# Patient Record
Sex: FEMALE | Race: WHITE | NOT HISPANIC OR LATINO | Employment: OTHER | URBAN - METROPOLITAN AREA
[De-identification: names, ages, dates, MRNs, and addresses within clinical notes are randomized per-mention and may not be internally consistent; named-entity substitution may affect disease eponyms.]

---

## 2024-09-25 ENCOUNTER — HOSPITAL ENCOUNTER (EMERGENCY)
Facility: HOSPITAL | Age: 39
Discharge: HOME | End: 2024-09-25
Attending: STUDENT IN AN ORGANIZED HEALTH CARE EDUCATION/TRAINING PROGRAM
Payer: MEDICAID

## 2024-09-25 VITALS
HEART RATE: 91 BPM | BODY MASS INDEX: 38.41 KG/M2 | HEIGHT: 64 IN | SYSTOLIC BLOOD PRESSURE: 149 MMHG | OXYGEN SATURATION: 98 % | WEIGHT: 225 LBS | RESPIRATION RATE: 18 BRPM | DIASTOLIC BLOOD PRESSURE: 92 MMHG | TEMPERATURE: 99 F

## 2024-09-25 DIAGNOSIS — T14.8XXA MUSCLE STRAIN: ICD-10-CM

## 2024-09-25 DIAGNOSIS — M25.512 LEFT SHOULDER PAIN, UNSPECIFIED CHRONICITY: Primary | ICD-10-CM

## 2024-09-25 PROCEDURE — 63600175 PHARM REV CODE 636 W HCPCS: Performed by: NURSE PRACTITIONER

## 2024-09-25 PROCEDURE — 96372 THER/PROPH/DIAG INJ SC/IM: CPT | Performed by: NURSE PRACTITIONER

## 2024-09-25 PROCEDURE — 99284 EMERGENCY DEPT VISIT MOD MDM: CPT | Mod: 25

## 2024-09-25 RX ORDER — IBUPROFEN 600 MG/1
600 TABLET ORAL EVERY 8 HOURS PRN
Qty: 15 TABLET | Refills: 0 | Status: SHIPPED | OUTPATIENT
Start: 2024-09-25

## 2024-09-25 RX ORDER — KETOROLAC TROMETHAMINE 30 MG/ML
60 INJECTION, SOLUTION INTRAMUSCULAR; INTRAVENOUS
Status: COMPLETED | OUTPATIENT
Start: 2024-09-25 | End: 2024-09-25

## 2024-09-25 RX ORDER — CYCLOBENZAPRINE HCL 5 MG
5 TABLET ORAL 3 TIMES DAILY PRN
Qty: 15 TABLET | Refills: 0 | Status: SHIPPED | OUTPATIENT
Start: 2024-09-25

## 2024-09-25 RX ORDER — HYDROCODONE BITARTRATE AND ACETAMINOPHEN 5; 325 MG/1; MG/1
1 TABLET ORAL EVERY 6 HOURS PRN
Qty: 12 TABLET | Refills: 0 | Status: SHIPPED | OUTPATIENT
Start: 2024-09-25

## 2024-09-25 RX ADMIN — KETOROLAC TROMETHAMINE 60 MG: 60 INJECTION, SOLUTION INTRAMUSCULAR at 08:09

## 2024-09-26 NOTE — ED PROVIDER NOTES
Encounter Date: 9/25/2024       History     Chief Complaint   Patient presents with    Shoulder Pain     Patient states left shoulder pain that began yesterday after she moved some heavy equipment at work.  States that pain is intermittent and worsens with movement and palpation.  States that pain is sharp in nature.  States that pain is an 8/10.  Denies any numbness or tingling to her extremities.  States that she did have some pain relief after using a heating pad last night.  Denies any past medical history.    The history is provided by the patient.   Shoulder Pain  This is a new problem. The current episode started yesterday. Episode frequency: Intermittently. The problem has not changed since onset.Pertinent negatives include no chest pain, no abdominal pain, no headaches and no shortness of breath. Exacerbated by: Movement. The symptoms are relieved by rest. She has tried a warm compress for the symptoms. The treatment provided mild relief.     Review of patient's allergies indicates:  No Known Allergies  Past Medical History:   Diagnosis Date    Rotator cuff injury     Bilateral     No past surgical history on file.  No family history on file.     Review of Systems   Constitutional: Negative.    HENT: Negative.     Eyes: Negative.    Respiratory: Negative.  Negative for shortness of breath.    Cardiovascular: Negative.  Negative for chest pain.   Gastrointestinal: Negative.  Negative for abdominal pain.   Endocrine: Negative.    Genitourinary: Negative.    Musculoskeletal:  Negative for back pain and neck pain.        Shoulder pain.   Skin: Negative.    Allergic/Immunologic: Negative.    Neurological: Negative.  Negative for headaches.   Hematological: Negative.    Psychiatric/Behavioral: Negative.     All other systems reviewed and are negative.      Physical Exam     Initial Vitals [09/25/24 1919]   BP Pulse Resp Temp SpO2   (!) 149/92 91 18 98.6 °F (37 °C) 98 %      MAP       --         Physical  Exam    Nursing note and vitals reviewed.  Constitutional: She appears well-developed and well-nourished. No distress.   HENT:   Head: Normocephalic and atraumatic.   Mouth/Throat: Oropharynx is clear and moist.   Eyes: Conjunctivae and EOM are normal. Pupils are equal, round, and reactive to light.   Neck: Neck supple.   Normal range of motion.  Cardiovascular:  Normal rate, regular rhythm, normal heart sounds and intact distal pulses.           Pulses:       Radial pulses are 2+ on the right side and 2+ on the left side.   Pulmonary/Chest: Breath sounds normal. No respiratory distress. She has no wheezes.   Abdominal: Abdomen is soft. Bowel sounds are normal. She exhibits no distension. There is no abdominal tenderness.   Musculoskeletal:         General: No edema. Normal range of motion.      Right shoulder: Normal.      Left shoulder: Tenderness present. No swelling. Normal range of motion. Normal strength.        Arms:       Cervical back: Normal range of motion and neck supple.     Neurological: She is alert and oriented to person, place, and time. She has normal strength. Gait normal. GCS score is 15. GCS eye subscore is 4. GCS verbal subscore is 5. GCS motor subscore is 6.   Skin: Skin is warm and dry. No rash noted.   Psychiatric: She has a normal mood and affect. Thought content normal.         ED Course   Procedures  Labs Reviewed - No data to display       Imaging Results              X-Ray Shoulder 2 or More Views Left (Preliminary result)  Result time 09/25/24 20:12:46      Wet Read by Leticia Bowman FNP (09/25/24 20:12:46, [facility] Emergency Dept, Emergency Medicine)    Negative.                                     Medications   ketorolac injection 60 mg (60 mg Intramuscular Given 9/25/24 2029)     Medical Decision Making  Patient states left shoulder pain that began yesterday after she moved some heavy equipment at work.  States that pain is intermittent and worsens with  movement and palpation.  States that pain is sharp in nature.  States that pain is an 8/10.  Denies any numbness or tingling to her extremities.  States that she did have some pain relief after using a heating pad last night.  Denies any past medical history.    The history is provided by the patient.   Shoulder Pain  This is a new problem. The current episode started yesterday. Episode frequency: Intermittently. The problem has not changed since onset.Pertinent negatives include no chest pain, no abdominal pain, no headaches and no shortness of breath. Exacerbated by: Movement. The symptoms are relieved by rest. She has tried a warm compress for the symptoms. The treatment provided mild relief.       Amount and/or Complexity of Data Reviewed  Radiology: ordered and independent interpretation performed.  Discussion of management or test interpretation with external provider(s): Differential diagnosis (including but not limited to):   Judging by the patient's chief complaint and pertinent history, the patient has the following possible differential diagnoses, including but not limited to the following.  Some of these are deemed to be lower likelihood and some more likely based on my physical exam and history combined with possible lab work and/or imaging studies.   Please see the pertinent studies, and refer to the HPI.  Some of these diagnoses will take further evaluation to fully rule out, perhaps as an outpatient and the patient was encouraged to follow up when discharged for more comprehensive evaluation.  fracture, sprain, strain, rotator cuff injury, muscle strain, shoulder pain  Patient's x-ray her left shoulder does not show any acute changes.  Patient is given Toradol IM for pain in the ED. patient has full range of motion to her left shoulder and patient appears to have a muscle strain type injury.  Will discharge with pain control.  ED return precautions given.          Risk  Prescription drug  management.                                      Clinical Impression:  Final diagnoses:  [M25.512] Left shoulder pain, unspecified chronicity (Primary)  [T14.8XXA] Muscle strain          ED Disposition Condition    Discharge Stable          ED Prescriptions       Medication Sig Dispense Start Date End Date Auth. Provider    ibuprofen (ADVIL,MOTRIN) 600 MG tablet Take 1 tablet (600 mg total) by mouth every 8 (eight) hours as needed for Pain. 15 tablet 9/25/2024 -- Leticia Bowman FNP    HYDROcodone-acetaminophen (NORCO) 5-325 mg per tablet Take 1 tablet by mouth every 6 (six) hours as needed for Pain. 12 tablet 9/25/2024 -- Leticia Bowman FNP    cyclobenzaprine (FLEXERIL) 5 MG tablet Take 1 tablet (5 mg total) by mouth 3 (three) times daily as needed for Muscle spasms. 15 tablet 9/25/2024 -- Leticia Bowman FNP          Follow-up Information       Follow up With Specialties Details Why Contact Info    Primary Care Provider  In 3 days      Please call [phone] to arrange a follow-up appointment with a Primary Care Provider.  In 1 week      Mitchell Peres MD Orthopedic Surgery  As needed [address]  [address]  [place] [address]  [phone]               Leticia Bowman FNP  09/25/24 2033

## 2025-01-07 ENCOUNTER — HOSPITAL ENCOUNTER (OUTPATIENT)
Dept: RADIOLOGY | Facility: HOSPITAL | Age: 40
Discharge: HOME | End: 2025-01-07
Attending: NURSE PRACTITIONER
Payer: MEDICAID

## 2025-01-07 ENCOUNTER — PATIENT MESSAGE (OUTPATIENT)
Dept: INTERNAL MEDICINE | Facility: CLINIC | Age: 40
End: 2025-01-07

## 2025-01-07 ENCOUNTER — OFFICE VISIT (OUTPATIENT)
Dept: INTERNAL MEDICINE | Facility: CLINIC | Age: 40
End: 2025-01-07
Payer: MEDICAID

## 2025-01-07 VITALS
RESPIRATION RATE: 18 BRPM | OXYGEN SATURATION: 96 % | DIASTOLIC BLOOD PRESSURE: 83 MMHG | BODY MASS INDEX: 40.1 KG/M2 | SYSTOLIC BLOOD PRESSURE: 118 MMHG | TEMPERATURE: 98 F | HEART RATE: 84 BPM | HEIGHT: 64 IN | WEIGHT: 234.88 LBS

## 2025-01-07 DIAGNOSIS — M25.512 CHRONIC PAIN OF BOTH SHOULDERS: ICD-10-CM

## 2025-01-07 DIAGNOSIS — R51.9 WORSENING HEADACHES: ICD-10-CM

## 2025-01-07 DIAGNOSIS — M25.511 CHRONIC PAIN OF BOTH SHOULDERS: ICD-10-CM

## 2025-01-07 DIAGNOSIS — G43.909 MIGRAINE WITHOUT STATUS MIGRAINOSUS, NOT INTRACTABLE, UNSPECIFIED MIGRAINE TYPE: ICD-10-CM

## 2025-01-07 DIAGNOSIS — G89.29 CHRONIC PAIN OF BOTH SHOULDERS: ICD-10-CM

## 2025-01-07 DIAGNOSIS — M25.512 CHRONIC PAIN OF BOTH SHOULDERS: Primary | ICD-10-CM

## 2025-01-07 DIAGNOSIS — M25.511 CHRONIC PAIN OF BOTH SHOULDERS: Primary | ICD-10-CM

## 2025-01-07 DIAGNOSIS — Z12.4 CERVICAL CANCER SCREENING: ICD-10-CM

## 2025-01-07 DIAGNOSIS — G89.29 CHRONIC PAIN OF BOTH SHOULDERS: Primary | ICD-10-CM

## 2025-01-07 DIAGNOSIS — Z00.00 WELLNESS EXAMINATION: Primary | ICD-10-CM

## 2025-01-07 DIAGNOSIS — Z11.3 SCREENING EXAMINATION FOR STD (SEXUALLY TRANSMITTED DISEASE): ICD-10-CM

## 2025-01-07 DIAGNOSIS — Z11.4 SCREENING FOR HIV (HUMAN IMMUNODEFICIENCY VIRUS): ICD-10-CM

## 2025-01-07 DIAGNOSIS — Z12.31 BREAST CANCER SCREENING BY MAMMOGRAM: ICD-10-CM

## 2025-01-07 PROCEDURE — 73030 X-RAY EXAM OF SHOULDER: CPT | Mod: TC,LT

## 2025-01-07 PROCEDURE — 1159F MED LIST DOCD IN RCRD: CPT | Mod: CPTII,,, | Performed by: NURSE PRACTITIONER

## 2025-01-07 PROCEDURE — 1160F RVW MEDS BY RX/DR IN RCRD: CPT | Mod: CPTII,,, | Performed by: NURSE PRACTITIONER

## 2025-01-07 PROCEDURE — 3074F SYST BP LT 130 MM HG: CPT | Mod: CPTII,,, | Performed by: NURSE PRACTITIONER

## 2025-01-07 PROCEDURE — 99215 OFFICE O/P EST HI 40 MIN: CPT | Mod: PBBFAC | Performed by: NURSE PRACTITIONER

## 2025-01-07 PROCEDURE — 99385 PREV VISIT NEW AGE 18-39: CPT | Mod: S$PBB,,, | Performed by: NURSE PRACTITIONER

## 2025-01-07 PROCEDURE — 99214 OFFICE O/P EST MOD 30 MIN: CPT | Mod: 25,S$PBB,, | Performed by: NURSE PRACTITIONER

## 2025-01-07 PROCEDURE — 3008F BODY MASS INDEX DOCD: CPT | Mod: CPTII,,, | Performed by: NURSE PRACTITIONER

## 2025-01-07 PROCEDURE — 3079F DIAST BP 80-89 MM HG: CPT | Mod: CPTII,,, | Performed by: NURSE PRACTITIONER

## 2025-01-07 PROCEDURE — 73030 X-RAY EXAM OF SHOULDER: CPT | Mod: TC,RT

## 2025-01-07 RX ORDER — CYCLOBENZAPRINE HCL 10 MG
10 TABLET ORAL 3 TIMES DAILY PRN
Qty: 60 TABLET | Refills: 5 | Status: SHIPPED | OUTPATIENT
Start: 2025-01-07

## 2025-01-07 RX ORDER — IBUPROFEN 800 MG/1
800 TABLET ORAL EVERY 8 HOURS PRN
Qty: 90 TABLET | Refills: 2 | Status: SHIPPED | OUTPATIENT
Start: 2025-01-07

## 2025-01-07 RX ORDER — UBROGEPANT 100 MG/1
100 TABLET ORAL
Qty: 30 TABLET | Refills: 2 | Status: SHIPPED | OUTPATIENT
Start: 2025-01-07 | End: 2025-07-06

## 2025-01-07 RX ORDER — CYCLOBENZAPRINE HCL 10 MG
10 TABLET ORAL 3 TIMES DAILY PRN
Qty: 60 TABLET | Refills: 5 | Status: SHIPPED | OUTPATIENT
Start: 2025-01-07 | End: 2025-01-07

## 2025-01-07 NOTE — PROGRESS NOTES
DAVID Castañeda   [facility]  [address] [address]      PATIENT NAME: Tiesha Pradhan  : 1985  DATE: 25  MRN: [id number]      Patient PCP Information       Provider PCP Type    DAVID Castañeda General            Reason for Visit / Chief Complaint: Headache (Migraine management)       History of Present Illness / Problem Focused Workflow     Tiesha Pradhan presents to the clinic with Headache (Migraine management)     38 yo female here to establish care.     Cervical Cancer Screening:  Breast Cancer Screening:  Colon Cancer Screening:   Osteoporosis Screenin2025 Vitamin D Level   Diabetic Screening:  STD Screenin2025  Wellness Screenin2025  Pt her today to establish care; agreeable to routine f/u with results via portal.   Pt reports with a history of painful headaches since the age of 15. Usually the left side, stabbing and burning pain, wakes up with the headache, laying flat makes it worse, movements up and down can cause more pain. They last about a day if unable to manage. Usually occurs about 1 x per week. Feels sometimes that if she sleeps in they occur, when the weather changes sometimes they occur. Reports was previously taking Fioricet. Has not specifically had any brain imaging for migraines. Reports throughout the years they have gotten worse and more painful, reports lately not as manageable. Agreeable to MRI Brain for evaluation. Will send referral to Neurology pending results. Pt agreeable to med mgt with Ubrevly prn. If not approved by insurance will change to sumatriptan prn. [place] Precautions.   Bilateral shoulder pain x > 1 year. Agreeable to x-rays today of both shoulders along with continuation of ibuprofen and flexeril 10 mg TID prn. Pt agreeable to referral for PT once x-ray results are available as well. Will f/u via portal.   Rojelio any other issues at this  "time. Will f/u in 1 year for wellness and prn.             Review of Systems     Review of Systems   Constitutional: Negative.    HENT: Negative.     Eyes: Negative.    Respiratory: Negative.     Cardiovascular: Negative.    Gastrointestinal: Negative.    Endocrine: Negative.    Genitourinary: Negative.    Musculoskeletal:  Positive for arthralgias.   Neurological:  Positive for headaches.   Psychiatric/Behavioral: Negative.           Medications and Allergies     Medications  Current Outpatient Medications   Medication Instructions    cyclobenzaprine (FLEXERIL) 10 mg, Oral, 3 times daily PRN    ibuprofen (ADVIL,MOTRIN) 800 mg, Oral, Every 8 hours PRN    UBRELVY 100 mg, Oral, As needed (PRN), If symptoms persist or return, may repeat dose after 2 hours. Maximum: 200 mg per 24 hours         Allergies  Review of patient's allergies indicates:  No Known Allergies    Physical Examination     Visit Vitals  /83 (BP Location: Left arm, Patient Position: Sitting)   Pulse 84   Temp 98.3 °F (36.8 °C) (Oral)   Resp 18   Ht 5' 4" (1.626 m)   Wt 106.5 kg (234 lb 14.4 oz)   SpO2 96%   BMI 40.32 kg/m²       Physical Exam  Vitals reviewed.   Constitutional:       Appearance: Normal appearance. She is normal weight.   HENT:      Head: Normocephalic.   Cardiovascular:      Rate and Rhythm: Normal rate and regular rhythm.      Pulses: Normal pulses.      Heart sounds: Normal heart sounds.   Pulmonary:      Effort: Pulmonary effort is normal.      Breath sounds: Normal breath sounds.   Abdominal:      General: Abdomen is flat.      Palpations: Abdomen is soft.   Musculoskeletal:         General: Normal range of motion.      Cervical back: Normal range of motion.   Skin:     General: Skin is warm and dry.   Neurological:      Mental Status: She is alert.   Psychiatric:         Mood and Affect: Mood normal.           Results       Assessment        ICD-10-CM ICD-9-CM   1. Wellness examination  Z00.00 V70.0   2. Chronic pain of both " shoulders  M25.511 719.41    G89.29 338.29    M25.512    3. Migraine without status migrainosus, not intractable, unspecified migraine type  G43.909 346.90   4. Screening for HIV (human immunodeficiency virus)  Z11.4 V73.89   5. Screening examination for STD (sexually transmitted disease)  Z11.3 V74.5   6. Cervical cancer screening  Z12.4 V76.2   7. Worsening headaches  R51.9 784.0   8. Breast cancer screening by mammogram  Z12.31 V76.12        Plan      1. Wellness examination  Assessment & Plan:  Pt wellness visit completed today with appropriate lab work.   HM Topics Reviewed / Updated  Immunizations Discussed  Dicussed Healthy Diet &   Encouraged to exercise 3 x weekly  Increase Water Intake  Eat more fruits and vegetables  Avoid soda & alcohol      Orders:  -     T4, Free; Future; Expected date: 01/07/2025  -     TSH; Future; Expected date: 01/07/2025  -     Hemoglobin A1C; Future; Expected date: 01/07/2025  -     Vitamin D; Future; Expected date: 01/07/2025  -     Comprehensive Metabolic Panel; Future; Expected date: 01/07/2025  -     Urinalysis, Reflex to Urine Culture; Future; Expected date: 01/07/2025  -     Lipid Panel; Future; Expected date: 01/07/2025  -     CBC Auto Differential; Future; Expected date: 01/07/2025    2. Chronic pain of both shoulders  Assessment & Plan:  Bilateral Shoulder x-rays today  Pending - referral for PT  Continue RX ibuprofen 800 mg prn and flexeril 10 mg TID prn  Perform retches daily.   Avoid activities than increase pain or stiffness.   Apply heating pad, ice pack, and Icy Hot / BioFreeze as needed; alternate every 15-20 minutes.   Massage back to loosen muscles.       Orders:  -     X-ray Shoulder 2 or More Views Right; Future; Expected date: 01/07/2025  -     X-Ray Shoulder 2 or More Views Left; Future; Expected date: 01/07/2025  -     Discontinue: cyclobenzaprine (FLEXERIL) 10 MG tablet; Take 1 tablet (10 mg total) by mouth 3 (three) times daily as needed for Muscle  spasms.  Dispense: 60 tablet; Refill: 5  -     ibuprofen (ADVIL,MOTRIN) 800 MG tablet; Take 1 tablet (800 mg total) by mouth every 8 (eight) hours as needed for Pain.  Dispense: 90 tablet; Refill: 2  -     cyclobenzaprine (FLEXERIL) 10 MG tablet; Take 1 tablet (10 mg total) by mouth 3 (three) times daily as needed for Muscle spasms.  Dispense: 60 tablet; Refill: 5    3. Migraine without status migrainosus, not intractable, unspecified migraine type  Assessment & Plan:  MRI brain ordered  Pending referral to Neuro  Start RX Ubrevly prn   [facility] [place]       Orders:  -     ubrogepant (UBRELVY) 100 mg tablet; Take 1 tablet (100 mg total) by mouth as needed for Migraine. If symptoms persist or return, may repeat dose after 2 hours. Maximum: 200 mg per 24 hours  Dispense: 30 tablet; Refill: 2  -     Iron and TIBC; Future; Expected date: 01/07/2025  -     Ferritin; Future; Expected date: 01/07/2025  -     Vitamin B12; Future; Expected date: 01/07/2025  -     Folate; Future; Expected date: 01/07/2025    4. Screening for HIV (human immunodeficiency virus)  -     HIV 1/2 Ag/Ab (4th Gen); Future; Expected date: 01/07/2025    5. Screening examination for STD (sexually transmitted disease)  -     Chlamydia/GC, PCR; Future; Expected date: 01/07/2025  -     SYPHILIS ANTIBODY (WITH REFLEX RPR); Future; Expected date: 01/07/2025  -     Hepatitis Panel, Acute; Future; Expected date: 01/07/2025    6. Cervical cancer screening  -     Ambulatory referral/consult to Gynecology; Future; Expected date: 01/07/2025    7. Worsening headaches  -     MRI Brain Without Contrast; Future; Expected date: 01/07/2025    8. Breast cancer screening by mammogram  -     Mammo Digital Screening Bilat; Future; Expected date: 01/20/2025         Future Appointments   Date Time Provider Department Center   1/6/2026  8:00 AM [facility]        Follow up in about 1 year (around 1/8/2026) for Wellness w/ Labs.      Signature:      [facility]  [address] [address]    Date of encounter: 1/7/25

## 2025-01-07 NOTE — ASSESSMENT & PLAN NOTE
Bilateral Shoulder x-rays today  Pending - referral for PT  Continue RX ibuprofen 800 mg prn and flexeril 10 mg TID prn  Perform retches daily.   Avoid activities than increase pain or stiffness.   Apply heating pad, ice pack, and Icy Hot / BioFreeze as needed; alternate every 15-20 minutes.   Massage back to loosen muscles.

## 2025-01-08 ENCOUNTER — OFFICE VISIT (OUTPATIENT)
Dept: GYNECOLOGY | Facility: CLINIC | Age: 40
End: 2025-01-08
Payer: MEDICAID

## 2025-01-08 VITALS
TEMPERATURE: 98 F | DIASTOLIC BLOOD PRESSURE: 78 MMHG | HEIGHT: 65 IN | OXYGEN SATURATION: 100 % | RESPIRATION RATE: 18 BRPM | BODY MASS INDEX: 39.02 KG/M2 | HEART RATE: 86 BPM | SYSTOLIC BLOOD PRESSURE: 118 MMHG | WEIGHT: 234.19 LBS

## 2025-01-08 DIAGNOSIS — Z30.431 SURVEILLANCE FOR BIRTH CONTROL, INTRAUTERINE DEVICE: ICD-10-CM

## 2025-01-08 DIAGNOSIS — Z12.4 ENCOUNTER FOR PAPANICOLAOU SMEAR FOR CERVICAL CANCER SCREENING: Primary | ICD-10-CM

## 2025-01-08 DIAGNOSIS — N91.2 AMENORRHEA: ICD-10-CM

## 2025-01-08 LAB
B-HCG UR QL: NEGATIVE
CTP QC/QA: YES
FSH SERPL-ACNC: 8.49 MIU/ML
PROLACTIN LEVEL (OLG): 13.39 NG/ML (ref 5.18–26.53)
TESTOST SERPL-MCNC: 37.45 NG/DL (ref 13.84–53.35)

## 2025-01-08 PROCEDURE — 87624 HPV HI-RISK TYP POOLED RSLT: CPT

## 2025-01-08 PROCEDURE — 99385 PREV VISIT NEW AGE 18-39: CPT | Mod: S$PBB,,,

## 2025-01-08 PROCEDURE — 99214 OFFICE O/P EST MOD 30 MIN: CPT | Mod: PBBFAC

## 2025-01-08 PROCEDURE — 36415 COLL VENOUS BLD VENIPUNCTURE: CPT

## 2025-01-08 PROCEDURE — 3044F HG A1C LEVEL LT 7.0%: CPT | Mod: CPTII,,,

## 2025-01-08 PROCEDURE — 3078F DIAST BP <80 MM HG: CPT | Mod: CPTII,,,

## 2025-01-08 PROCEDURE — 3008F BODY MASS INDEX DOCD: CPT | Mod: CPTII,,,

## 2025-01-08 PROCEDURE — 81025 URINE PREGNANCY TEST: CPT | Mod: PBBFAC

## 2025-01-08 PROCEDURE — 88174 CYTOPATH C/V AUTO IN FLUID: CPT

## 2025-01-08 PROCEDURE — 84403 ASSAY OF TOTAL TESTOSTERONE: CPT

## 2025-01-08 PROCEDURE — 3074F SYST BP LT 130 MM HG: CPT | Mod: CPTII,,,

## 2025-01-08 PROCEDURE — 1159F MED LIST DOCD IN RCRD: CPT | Mod: CPTII,,,

## 2025-01-08 NOTE — PROGRESS NOTES
"  [facility] -  Gynecology / Women's Health Clinic     Subjective:      Patient ID: Tiesha Pradhan is a 39 y.o. female.    Chief Complaint:  Well Woman      History of Present Illness:  The patient G0 here for annual exam. Patient admits Amenorrhea since Kyleena insertion 5/2018. Denies hx of heavy bleeding, admits hx of irregular cycles prior to IUD. Admits hx of abnormal pap with colpo 10 yrs ago, all other paps normal. Last pap 2022-NIL and HPV neg. Denies breast or urinary complaints. Denies pelvic pain or abnormal vaginal discharge. Pt reports of chlamydia and no concerns today. Contraception consist of Kyleena IUD since 5/2018 per patient. Denies tobacco use. Dep. screening 0. Denies fly hx of breast, ovarian, uterine or colon cancer.     GYN & OB History:  No LMP recorded. Patient has had an implant.     OB History   No obstetric history on file.       Past Medical History:   Diagnosis Date    Rotator cuff injury     Bilateral        History reviewed. No pertinent surgical history.     Social History     Tobacco Use    Smoking status: Never    Smokeless tobacco: Never   Substance and Sexual Activity    Alcohol use: Not on file    Drug use: Yes     Types: Marijuana    Sexual activity: Yes     Birth control/protection: I.U.D.     Comment: kyleena 06/2018        Current Outpatient Medications   Medication Instructions    cyclobenzaprine (FLEXERIL) 10 mg, Oral, 3 times daily PRN    ibuprofen (ADVIL,MOTRIN) 800 mg, Oral, Every 8 hours PRN    UBRELVY 100 mg, Oral, As needed (PRN), If symptoms persist or return, may repeat dose after 2 hours. Maximum: 200 mg per 24 hours       Review of patient's allergies indicates:  No Known Allergies      Review of Systems:  Review of Systems  Negative except for pertinent findings for positives per HPI.     Objective:     Physical Exam   Visit Vitals  /78 (Patient Position: Sitting)   Pulse 86   Temp 98.4 °F (36.9 °C) (Oral)   Resp 18   Ht 5' 5" " (1.651 m)   Wt 106.2 kg (234 lb 3.2 oz)   SpO2 100%   BMI 38.97 kg/m²       GENERAL: Well-developed female. No acute distress.    SKIN: Normal to inspection, warm and intact.  BREASTS: No rashes or erythema. No masses, lumps, discharge, tenderness.  VULVA: General appearance normal; external genitalia with no lesions or erythema.  VAGINA: Mucosa/vaginal vault pink, no abnormal discharge or lesions.  CERVIX: Pink, nulliparous appearing os, 2 IUD strings visible, no erythema or abnormal discharge.  BIMANUAL EXAM: reveals a 8 week-sized uterus. The uterus is non tender. Bilateral adnexa reveal no tenderness.  PSYCHIATRIC: Patient is oriented to person, place, and time. Mood and affect are normal.    Assessment:       ICD-10-CM ICD-9-CM   1. Encounter for Papanicolaou smear for cervical cancer screening  Z12.4 V76.2   2. Surveillance for birth control, intrauterine device  Z30.431 V25.42   3. Amenorrhea  N91.2 626.0       Plan:     1. Encounter for Papanicolaou smear for cervical cancer screening  -     Ambulatory referral/consult to Gynecology  -     Liquid-Based Pap Smear, Screening    2. Surveillance for birth control, intrauterine device    3. Amenorrhea  -     Prolactin; Future; Expected date: 01/08/2025  -     Testosterone; Future; Expected date: 01/08/2025  -     Follicle Stimulating Hormone; Future; Expected date: 01/08/2025  -     US Pelvis Comp with Transvag NON-OB (xpd; Future; Expected date: 01/08/2025  -     POCT urine pregnancy    Pap today    Amenorrhea, IUD Kyleena in since 5/2018 per patient, denies cycles since insertion of Kyleena. Pt would like to replace Kyleena will refer to GYN residents/docs for Kyleena removal and insertion  Personal hx of Migraines with aura.   Labs/UPT - neg/ Pelvic uls ordered    Call with any GYN concerns  Follow up in about 1 year (around 1/8/2026) for Annual.

## 2025-01-10 LAB
HIGH RISK HPV: NEGATIVE
PSYCHE PATHOLOGY RESULT: NORMAL

## 2025-01-15 ENCOUNTER — OFFICE VISIT (OUTPATIENT)
Dept: INTERNAL MEDICINE | Facility: CLINIC | Age: 40
End: 2025-01-15
Payer: MEDICAID

## 2025-01-15 DIAGNOSIS — E55.9 VITAMIN D DEFICIENCY: Primary | ICD-10-CM

## 2025-01-15 RX ORDER — ERGOCALCIFEROL 1.25 MG/1
50000 CAPSULE ORAL
Qty: 8 CAPSULE | Refills: 0 | Status: SHIPPED | OUTPATIENT
Start: 2025-01-15 | End: 2025-03-06

## 2025-01-15 NOTE — ASSESSMENT & PLAN NOTE
Educated on increasing foods high in Vitamin D such as fish oil, cod liver oil, salmon, milk fortified with vitamin D.  RX Vitamin D3 35969 IU weekly x 12 weeks.  Complete entire 12 weeks of Vitamin D prescription.  After completion of prescription (12 weeks/3 months), begin taking Vitamin D 2000 I.U. tablets daily (purchase over the counter).  Repeat Vitamin D level as ordered.    Lab Results   Component Value Date    [id number] 10 (L) 01/07/2025

## 2025-01-15 NOTE — PROGRESS NOTES
DAVID Castañeda   [facility]  [address] [facility] [address]      PATIENT NAME: Tiesha Pradhan  : 1985  DATE: 1/15/25  MRN: [id number]      Patient PCP Information       Provider PCP Type    DAVID Castañeda General            Reason for Visit / Chief Complaint: Hyperlipidemia       History of Present Illness / Problem Focused Workflow     Tiesha Pradhan presents to the clinic with Hyperlipidemia     38 yo female here to establish care.      Cervical Cancer Screening:  Breast Cancer Screening:  Colon Cancer Screening:   Osteoporosis Screenin2025 Vitamin D Level   Diabetic Screening:  STD Screenin2025  Wellness Screenin2025  Pt her today to establish care; agreeable to routine f/u with results via portal.   Pt reports with a history of painful headaches since the age of 15. Usually the left side, stabbing and burning pain, wakes up with the headache, laying flat makes it worse, movements up and down can cause more pain. They last about a day if unable to manage. Usually occurs about 1 x per week. Feels sometimes that if she sleeps in they occur, when the weather changes sometimes they occur. Reports was previously taking Fioricet. Has not specifically had any brain imaging for migraines. Reports throughout the years they have gotten worse and more painful, reports lately not as manageable. Agreeable to MRI Brain for evaluation. Will send referral to Neurology pending results. Pt agreeable to med mgt with Ubrevly prn. If not approved by insurance will change to sumatriptan prn. UCC Precautions.   Bilateral shoulder pain x > 1 year. Agreeable to x-rays today of both shoulders along with continuation of ibuprofen and flexeril 10 mg TID prn. Pt agreeable to referral for PT once x-ray results are available as well. Will f/u via portal.   Rojelio any other issues at this time. Will f/u in 1 year for  wellness and prn.       01/15/2025  Pt here today via virtual for lab review;           Review of Systems     Review of Systems   Constitutional:  Negative for activity change and unexpected weight change.   HENT:  Negative for hearing loss, rhinorrhea and trouble swallowing.    Eyes:  Negative for discharge and visual disturbance.   Respiratory:  Negative for chest tightness and wheezing.    Cardiovascular:  Negative for chest pain and palpitations.   Gastrointestinal:  Negative for blood in stool, constipation, diarrhea and vomiting.   Endocrine: Negative for polydipsia and polyuria.   Genitourinary:  Negative for difficulty urinating, dysuria, hematuria and menstrual problem.   Musculoskeletal:  Negative for arthralgias, joint swelling and neck pain.   Neurological:  Negative for weakness and headaches.   Psychiatric/Behavioral:  Negative for confusion and dysphoric mood.          Medications and Allergies     Medications  Current Outpatient Medications   Medication Instructions    cyclobenzaprine (FLEXERIL) 10 mg, Oral, 3 times daily PRN    ergocalciferol (ERGOCALCIFEROL) 50,000 Units, Oral, Every 7 days, For Low Vitamin D. Start over the counter once completed.    ibuprofen (ADVIL,MOTRIN) 800 mg, Oral, Every 8 hours PRN    UBRELVY 100 mg, Oral, As needed (PRN), If symptoms persist or return, may repeat dose after 2 hours. Maximum: 200 mg per 24 hours         Allergies  Review of patient's allergies indicates:  No Known Allergies    Physical Examination     There were no vitals taken for this visit.    Physical Exam  HENT:      Right Ear: Hearing normal.      Left Ear: Hearing normal.   Neurological:      Mental Status: She is alert and oriented to person, place, and time.   Psychiatric:         Mood and Affect: Mood normal.           Results     Lab Results   Component Value Date    WBC 8.63 01/07/2025    RBC 5.32 01/07/2025    HGB 15.0 01/07/2025    HCT 44.3 01/07/2025    MCV 83.3 01/07/2025    MCH 28.2  01/07/2025    MCHC 33.9 01/07/2025    RDW 12.3 01/07/2025     01/07/2025    MPV 9.4 01/07/2025     CMP  Sodium   Date Value Ref Range Status   01/07/2025 136 136 - 145 mmol/L Final     Potassium   Date Value Ref Range Status   01/07/2025 3.9 3.5 - 5.1 mmol/L Final     Chloride   Date Value Ref Range Status   01/07/2025 105 98 - 107 mmol/L Final     CO2   Date Value Ref Range Status   01/07/2025 25 22 - 29 mmol/L Final     Blood Urea Nitrogen   Date Value Ref Range Status   01/07/2025 20.1 (H) 7.0 - 18.7 mg/dL Final     Creatinine   Date Value Ref Range Status   01/07/2025 0.80 0.55 - 1.02 mg/dL Final     Calcium   Date Value Ref Range Status   01/07/2025 9.5 8.4 - 10.2 mg/dL Final     Albumin   Date Value Ref Range Status   01/07/2025 4.0 3.5 - 5.0 g/dL Final     Bilirubin Total   Date Value Ref Range Status   01/07/2025 0.5 <=1.5 mg/dL Final     ALP   Date Value Ref Range Status   01/07/2025 76 40 - 150 unit/L Final     AST   Date Value Ref Range Status   01/07/2025 19 5 - 34 unit/L Final     ALT   Date Value Ref Range Status   01/07/2025 33 0 - 55 unit/L Final     Lab Results   Component Value Date    CHOL 206 (H) 01/07/2025     Lab Results   Component Value Date    HDL 50 01/07/2025     Lab Results   Component Value Date    TRIG 162 (H) 01/07/2025     Lab Results   Component Value Date    VLDL 32 01/07/2025     Lab Results   Component Value Date    .00 01/07/2025     Lab Results   Component Value Date    TSH 3.073 01/07/2025         Assessment        ICD-10-CM ICD-9-CM   1. Vitamin D deficiency  E55.9 268.9        Plan      1. Vitamin D deficiency  Assessment & Plan:  Educated on increasing foods high in Vitamin D such as fish oil, cod liver oil, salmon, milk fortified with vitamin D.  RX Vitamin D3 75792 IU weekly x 12 weeks.  Complete entire 12 weeks of Vitamin D prescription.  After completion of prescription (12 weeks/3 months), begin taking Vitamin D 2000 I.U. tablets daily (purchase over the  counter).  Repeat Vitamin D level as ordered.    Lab Results   Component Value Date    [id number] 10 (L) 01/07/2025         Orders:  -     ergocalciferol (ERGOCALCIFEROL) 50,000 unit Cap; Take 1 capsule (50,000 Units total) by mouth every 7 days. For Low Vitamin D. Start over the counter once completed. for 8 doses  Dispense: 8 capsule; Refill: 0         Future Appointments   Date Time Provider Department Center   1/17/2025  7:00 AM [facility] MRI1 350 LB LIMIT [facility] MRI [facility]   1/17/2025  7:30 AM [facility] US1 [facility] US [facility]   1/21/2025  8:15 AM [facility]   1/29/2025 10:45 AM [facility] GYN [facility] GYN [facility]   1/6/2026  8:00 AM Karina Adkins FNP [facility] INTMED [facility]   1/9/2026  9:50 AM Paulette Hinds FNP [facility] GYN [facility]        Follow up if symptoms worsen or fail to improve.      Signature:     [facility]  [address] [facility] [address]    Date of encounter: 1/15/25    The patient location is: home  The chief complaint leading to consultation is: lab review    Visit type: audiovisual    Face to Face time with patient: 20   25 minutes of total time spent on the encounter, which includes face to face time and non-face to face time preparing to see the patient (eg, review of tests), Obtaining and/or reviewing separately obtained history, Documenting clinical information in the electronic or other health record, Independently interpreting results (not separately reported) and communicating results to the patient/family/caregiver, or Care coordination (not separately reported).         Each patient to whom he or she provides medical services by telemedicine is:  (1) informed of the relationship between the physician and patient and the respective role of any other health care provider with respect to management of the patient; and (2) notified that he or she may decline to receive  medical services by telemedicine and may withdraw from such care at any time.    Notes:

## 2025-01-17 ENCOUNTER — HOSPITAL ENCOUNTER (OUTPATIENT)
Dept: RADIOLOGY | Facility: HOSPITAL | Age: 40
Discharge: HOME | End: 2025-01-17
Payer: MEDICAID

## 2025-01-17 ENCOUNTER — PATIENT MESSAGE (OUTPATIENT)
Dept: INTERNAL MEDICINE | Facility: CLINIC | Age: 40
End: 2025-01-17
Payer: MEDICAID

## 2025-01-17 ENCOUNTER — HOSPITAL ENCOUNTER (OUTPATIENT)
Dept: RADIOLOGY | Facility: HOSPITAL | Age: 40
Discharge: HOME | End: 2025-01-17
Attending: NURSE PRACTITIONER
Payer: MEDICAID

## 2025-01-17 DIAGNOSIS — R51.9 WORSENING HEADACHES: ICD-10-CM

## 2025-01-17 DIAGNOSIS — G43.809 OTHER MIGRAINE WITHOUT STATUS MIGRAINOSUS, NOT INTRACTABLE: Primary | ICD-10-CM

## 2025-01-17 DIAGNOSIS — N91.2 AMENORRHEA: ICD-10-CM

## 2025-01-17 PROCEDURE — 76830 TRANSVAGINAL US NON-OB: CPT | Mod: TC

## 2025-01-17 PROCEDURE — 70551 MRI BRAIN STEM W/O DYE: CPT | Mod: TC

## 2025-01-20 ENCOUNTER — PATIENT MESSAGE (OUTPATIENT)
Dept: ADMINISTRATIVE | Facility: OTHER | Age: 40
End: 2025-01-20
Payer: MEDICAID

## 2025-01-28 ENCOUNTER — HOSPITAL ENCOUNTER (OUTPATIENT)
Dept: RADIOLOGY | Facility: HOSPITAL | Age: 40
Discharge: HOME | End: 2025-01-28
Attending: NURSE PRACTITIONER
Payer: MEDICAID

## 2025-01-28 DIAGNOSIS — Z12.31 BREAST CANCER SCREENING BY MAMMOGRAM: ICD-10-CM

## 2025-01-28 PROCEDURE — 77067 SCR MAMMO BI INCL CAD: CPT | Mod: TC

## 2025-01-28 PROCEDURE — 77063 BREAST TOMOSYNTHESIS BI: CPT | Mod: 26,,, | Performed by: RADIOLOGY

## 2025-01-28 PROCEDURE — 77067 SCR MAMMO BI INCL CAD: CPT | Mod: 26,,, | Performed by: RADIOLOGY

## 2025-01-29 ENCOUNTER — PROCEDURE VISIT (OUTPATIENT)
Dept: GYNECOLOGY | Facility: CLINIC | Age: 40
End: 2025-01-29
Payer: MEDICAID

## 2025-01-29 VITALS
BODY MASS INDEX: 38.76 KG/M2 | DIASTOLIC BLOOD PRESSURE: 83 MMHG | HEIGHT: 65 IN | HEART RATE: 78 BPM | RESPIRATION RATE: 18 BRPM | TEMPERATURE: 98 F | WEIGHT: 232.63 LBS | SYSTOLIC BLOOD PRESSURE: 127 MMHG | OXYGEN SATURATION: 100 %

## 2025-01-29 DIAGNOSIS — Z30.433 ENCOUNTER FOR REMOVAL AND REINSERTION OF INTRAUTERINE CONTRACEPTIVE DEVICE (IUD): Primary | ICD-10-CM

## 2025-01-29 LAB
B-HCG UR QL: NEGATIVE
CTP QC/QA: YES

## 2025-01-29 PROCEDURE — 81025 URINE PREGNANCY TEST: CPT | Mod: PBBFAC

## 2025-01-29 PROCEDURE — 58300 INSERT INTRAUTERINE DEVICE: CPT | Mod: PBBFAC

## 2025-01-29 PROCEDURE — 58301 REMOVE INTRAUTERINE DEVICE: CPT | Mod: PBBFAC

## 2025-01-29 RX ADMIN — LEVONORGESTREL 1 INTRA UTERINE DEVICE: 52 INTRAUTERINE DEVICE INTRAUTERINE at 11:01

## 2025-01-29 NOTE — PROCEDURES
[facility] OBSTETRICS AND GYNECOLOGY CLINIC NOTE     Tiesha Pradhan is a 40 y.o.  presenting to GYN clinic from NP for Kylena removal ( 6 years) and reinsertion. Has been amenorrheic. No other gyn concerns.      Past Medical History:   Diagnosis Date    Abnormal Pap smear of cervix     Rotator cuff injury     Bilateral      Past Surgical History:   Procedure Laterality Date    ECTOPIC PREGNANCY SURGERY        OB History    Para Term  AB Living   1       1     SAB IAB Ectopic Multiple Live Births       1          # Outcome Date GA Lbr Juan/2nd Weight Sex Type Anes PTL Lv   1 Ectopic                Gyn History:  LMP: No LMP recorded. Patient has had an implant.   Contraception: Kyleena x 6 years  H/o STIs: denies    Meds:   Current Outpatient Medications on File Prior to Visit   Medication Sig Dispense Refill    cyclobenzaprine (FLEXERIL) 10 MG tablet Take 1 tablet (10 mg total) by mouth 3 (three) times daily as needed for Muscle spasms. 60 tablet 5    ergocalciferol (ERGOCALCIFEROL) 50,000 unit Cap Take 1 capsule (50,000 Units total) by mouth every 7 days. For Low Vitamin D. Start over the counter once completed. for 8 doses 8 capsule 0    ibuprofen (ADVIL,MOTRIN) 800 MG tablet Take 1 tablet (800 mg total) by mouth every 8 (eight) hours as needed for Pain. 90 tablet 2    ubrogepant (UBRELVY) 100 mg tablet Take 1 tablet (100 mg total) by mouth as needed for Migraine. If symptoms persist or return, may repeat dose after 2 hours. Maximum: 200 mg per 24 hours 30 tablet 2     No current facility-administered medications on file prior to visit.       Allergies: Review of patient's allergies indicates:  No Known Allergies    Social History     Tobacco Use    Smoking status: Never    Smokeless tobacco: Never   Substance Use Topics    Alcohol use: Not Currently    Drug use: Yes     Types: Marijuana       Review of Systems  Negative unless in HPI    Objective:     /83 (BP Location: Left arm, Patient  "Position: Sitting)   Pulse 78   Temp 98.2 °F (36.8 °C) (Oral)   Resp 18   Ht 5' 5" (1.651 m)   Wt 105.5 kg (232 lb 9.6 oz)   SpO2 100%   BMI 38.71 kg/m²     Physical Exam:  Gen: Well-nourished, well-developed female appearing stated age. Alert, cooperative, in no acute distress.  CV: regular rate  Chest: no conversational dyspnea  Abdomen: Soft, non-tender, no masses.  Extrem: Extremities normal, atraumatic, no erythema  External genitalia: Normal female genetalia without lesion, discharge or tenderness.  Speculum Exam: Vaginal vault without discharge, nonodorous, no lesions/masses seen.  Cervical os visualized as closed, no lesions/masses. IUD string visualized  Bimanual Exam: 8cm size uterus, mobile. No CMT, No adnexal masses.  Nontender exam.     Note: RN chaperone present for entirety of exam.      Removal and Insertion of Intrauterine Device    Date/Time: 2025 10:45 AM    Performed by: Ilia Cooley MD  Authorized by: Ilia Cooley MD    Consent:     Consent given by:  Patient    Procedure risks and benefits discussed: yes      Patient questions answered: yes      Patient agrees, verbalizes understanding, and wants to proceed: yes    Removal Procedure:    IUD grasped by: ring forceps   Removed with no complications: IUD removal not due to complications   Removed due to expiration: Removal due to expiration  Insertion Procedure:   1 Intra Uterine Device levonorgestreL 52 mg       Pelvic exam performed: yes      Negative urine pregnancy test: yes      Negative serum pregnancy test: yes      Cervix cleaned and prepped: yes      Speculum placed in vagina: yes      Tenaculum applied to cervix: no      Uterus sounded: yes      Uterus sound depth (cm):  8    IUD inserted with no complications: yes      IUD type:  Mirena    Strings trimmed: yes    Post-procedure:     Patient tolerated procedure well: yes        Assessment/Plan:     40 y.o.  here for IUD removal and insertion, " uncomplicated.    Problem List Items Addressed This Visit          Renal/    Encounter for removal and reinsertion of intrauterine contraceptive device (IUD) - Primary    Relevant Medications    levonorgestreL (Mirena) 52 mg IUD 1 Intra Uterine Device (Completed)    Other Relevant Orders    POCT Urine Pregnancy (Completed)    Removal and Insertion of Intrauterine Device (Completed)     Return to clinic prn    Discussed patient and plan with Dr. Provost Ilia Cooley MD  [employer] [facility]-PGY4  01/29/2025 11:33 AM

## 2025-05-15 ENCOUNTER — OFFICE VISIT (OUTPATIENT)
Dept: NEUROLOGY | Facility: CLINIC | Age: 40
End: 2025-05-15
Payer: MEDICAID

## 2025-05-15 VITALS
OXYGEN SATURATION: 97 % | WEIGHT: 234 LBS | HEART RATE: 86 BPM | BODY MASS INDEX: 38.99 KG/M2 | HEIGHT: 65 IN | SYSTOLIC BLOOD PRESSURE: 125 MMHG | DIASTOLIC BLOOD PRESSURE: 86 MMHG

## 2025-05-15 DIAGNOSIS — G44.86 CERVICOGENIC HEADACHE: Chronic | ICD-10-CM

## 2025-05-15 DIAGNOSIS — R40.0 DAYTIME SOMNOLENCE: Chronic | ICD-10-CM

## 2025-05-15 DIAGNOSIS — E66.01 CLASS 2 SEVERE OBESITY WITH SERIOUS COMORBIDITY AND BODY MASS INDEX (BMI) OF 38.0 TO 38.9 IN ADULT, UNSPECIFIED OBESITY TYPE: Chronic | ICD-10-CM

## 2025-05-15 DIAGNOSIS — G43.809 OTHER MIGRAINE WITHOUT STATUS MIGRAINOSUS, NOT INTRACTABLE: Primary | Chronic | ICD-10-CM

## 2025-05-15 DIAGNOSIS — R06.83 LOUD SNORING: Chronic | ICD-10-CM

## 2025-05-15 DIAGNOSIS — E66.812 CLASS 2 SEVERE OBESITY WITH SERIOUS COMORBIDITY AND BODY MASS INDEX (BMI) OF 38.0 TO 38.9 IN ADULT, UNSPECIFIED OBESITY TYPE: Chronic | ICD-10-CM

## 2025-05-15 PROBLEM — G43.909 MIGRAINE WITHOUT STATUS MIGRAINOSUS, NOT INTRACTABLE: Chronic | Status: ACTIVE | Noted: 2025-01-07

## 2025-05-15 PROCEDURE — 99214 OFFICE O/P EST MOD 30 MIN: CPT | Mod: PBBFAC | Performed by: NURSE PRACTITIONER

## 2025-05-15 PROCEDURE — 1160F RVW MEDS BY RX/DR IN RCRD: CPT | Mod: CPTII,,, | Performed by: NURSE PRACTITIONER

## 2025-05-15 PROCEDURE — 1159F MED LIST DOCD IN RCRD: CPT | Mod: CPTII,,, | Performed by: NURSE PRACTITIONER

## 2025-05-15 PROCEDURE — 3074F SYST BP LT 130 MM HG: CPT | Mod: CPTII,,, | Performed by: NURSE PRACTITIONER

## 2025-05-15 PROCEDURE — 3008F BODY MASS INDEX DOCD: CPT | Mod: CPTII,,, | Performed by: NURSE PRACTITIONER

## 2025-05-15 PROCEDURE — 3079F DIAST BP 80-89 MM HG: CPT | Mod: CPTII,,, | Performed by: NURSE PRACTITIONER

## 2025-05-15 PROCEDURE — 99205 OFFICE O/P NEW HI 60 MIN: CPT | Mod: S$PBB,,, | Performed by: NURSE PRACTITIONER

## 2025-05-15 PROCEDURE — 3044F HG A1C LEVEL LT 7.0%: CPT | Mod: CPTII,,, | Performed by: NURSE PRACTITIONER

## 2025-05-15 RX ORDER — SUMATRIPTAN SUCCINATE 100 MG/1
100 TABLET ORAL 2 TIMES DAILY PRN
Qty: 12 TABLET | Refills: 2 | Status: SHIPPED | OUTPATIENT
Start: 2025-05-15 | End: 2025-06-14

## 2025-05-15 RX ORDER — ERGOCALCIFEROL 1.25 MG/1
50000 CAPSULE ORAL
COMMUNITY
Start: 2025-03-17

## 2025-05-15 NOTE — PROGRESS NOTES
"[facility] Neurology Initial Office Visit Note    Initial Visit Date: 5/15/2025  Last Visit Date:   Current Visit Date:  05/15/2025    Chief Complaint:     Chief Complaint   Patient presents with    Migraine     Pt reports onset in teen years, however they have increased over the last several years. Described as a burning, "deep hot knife" primarily above left eye and on left side down into neck and shoulders with stiffness. Laying down worsens the symptoms, she will have to recline or sit up. Associated w/ photo/phonophobia, denies n/v. States sudafed effective w/ IBU; Fioricet somewhat effective; Thinks she has tried and failed sumatriptan       History of Present Illness:      This is 40 y.o. female with history of chronic bilat shoulder pain, IUD, vitamin D deficiency, HLD, depression, anxiety who is referred headache disorder. Admits to snoring, may awaken with HA, daytime somnolence, does not waken gasping for air.    Age of Onset : 15 YO    Headache Description: located to L frontal area and radiates down to neck/shoulder with stiffness, described as a "hot knife", accompanied by photophobia, occasional phonophobia, denies N/V, laying down worsens HA, denies visual aura, sometimes awakens with them feeling like a "vice", neck tension, may last day and affect ADLs, must lay down in dark room    Frequency: 4 headache days per month.     Provocation Factors: oversleeping    Risk Factors  - Family history of headache disorder: Yes mother  - History of focal CNS lesions: No  - History of CNS infections: No  - History head trauma: Yes fell at work  - History of underlying mood disorder: Yes depression/anxiety  - History of sleep disorder: No  - Recreational drug use: Yes marijuana; medical card once weekly  - Tobacco use: No  - Alcohol use: Yes seltzer  - Weight fluctuation: Yes 15 pounds over last year  - Isotretinoin or Tetracycline use:  Unknown  - Family planning and contraceptive use: Yes IUD    Medications: "     Current Prophylactic  none    Current Abortive  Cyclobenzaprine 10mg PO TID PRN  Ibuprofen 800mg PO TID PRN    Prior Prophylactic  Wellbutrin    Prior Abortive  Fioricet     Devices:     - VNS:  - TNS  - TMS:     Procedures:     - Botox:  - PSG block:   - Occipital nerve block:     Labs:     Results for orders placed or performed in visit on 01/29/25   POCT Urine Pregnancy    Collection Time: 01/29/25 11:14 AM   Result Value Ref Range    POC Preg Test, Ur Negative Negative     Acceptable Yes        Studies:     - MRI Brain w/out Elijah: 1/17/2025: no acute intracranial abnormality  - MRA Head w/o Elijah:   - MRV Head w/o Elijah:   - NCHCT:  - Lumbar Puncture:    Review of Systems:     ROS  As per HPI. All other systems negative  Physical Exams:     Vitals:    05/15/25 0806   BP: 125/86   Pulse: 86       Physical Exam  Constitutional:       Appearance: Normal appearance. She is obese.   HENT:      Head: Normocephalic and atraumatic.      Right Ear: External ear normal.      Left Ear: External ear normal.      Nose: Nose normal.      Mouth/Throat:      Mouth: Mucous membranes are moist.      Pharynx: Oropharynx is clear.      Comments: Mallampati 4  Eyes:      Conjunctiva/sclera: Conjunctivae normal.   Cardiovascular:      Rate and Rhythm: Normal rate and regular rhythm.      Pulses: Normal pulses.      Heart sounds: Normal heart sounds.   Pulmonary:      Effort: Pulmonary effort is normal.      Breath sounds: Normal breath sounds.   Abdominal:      General: There is no distension.      Tenderness: There is no guarding.   Musculoskeletal:         General: Normal range of motion.      Cervical back: Normal range of motion and neck supple.   Skin:     General: Skin is warm and dry.      Coloration: Skin is not jaundiced.      Findings: No lesion or rash.   Neurological:      Mental Status: She is alert.     Comprehensive Neurological Exam:  Mental Status: Alert Oriented to Self, Date, and Place. Naming,  repetition, reading, and writing wnl. Comprehension wnl. No dysarthria.   CN II - XII: CYDNEY, No APD, Fundus wnl OU, VA grossly intact to finger counting at 6 ft, VFFC, No ptosis OU, EOMI without nystagmus LT/Temp symmetric in CN V1-3 distribution, Hearing grossly intact, Face Symmetric, Tongue and Uvula midline, Trapezius symmetric bilateral.   Motor: tone and bulk wnl throughout, no abnormal involuntary or voluntary movements, 5/5 to confrontation, Fine finger movements wnl b/l, No pronator drift.   Sensory: LT, Proprioception, Vibration, PP, Temp symmetric. No sensory simultagnosia.   Reflexes: 2+ throughout, plantar reflexes downward bilateral.   Cerebellar: FNF wnl b/l  Romberg: Negative  Gait: normal. Heel Gait, Toe Gait, Tandem Gait wnl. Bilat arm swing intact    Assessment:     This is 40 y.o. female with history of of chronic bilat shoulder pain, IUD, vitamin D deficiency, HLD, depression, anxiety who is referred headache disorder. Symptoms indicative of cervicogenic headache with migrainous features of photophobia and occasional phonophobia. Averages 4 HA per month. Admits to loud snoring, daytime somnolence, may awaken with HA. Never tested for GERARDO.    1. Other migraine without status migrainosus, not intractable  -     Ambulatory referral/consult to Neurology  -     sumatriptan (IMITREX) 100 MG tablet; Take 1 tablet (100 mg total) by mouth 2 (two) times daily as needed.  Dispense: 12 tablet; Refill: 2    2. Cervicogenic headache    3. Daytime somnolence    4. Class 2 severe obesity with serious comorbidity and body mass index (BMI) of 38.0 to 38.9 in adult, unspecified obesity type    5. Loud snoring      Plan:     [] c/w cyclobenzaprine 10mg nightly PRN  [] start sumatriptan 100mg PO BID Prn at onset of migraine as abortive therapy  [] drink 115 ounces water daily for optimal hydration  [] call for migraine >24 hrs and failed abortive therapy; will in HA cocktail  [] start exercise program 30 min daily x  5 days weekly for overall health and wellness  [] order home sleep study    RTC 4 mths - TM okay    Headache education provided: good sleep hygiene and 7 hours of sleep per night, stress management, medication overuse education provided. Using more 3 OTC per week may worsen headaches, high intensity interval training has shown to reduce headache frequency. Low carb, high protein has shown to reduce headache frequency. Patient is instructed in keep headache diary.     I have explained the treatment plan, diagnosis, and prognosis to patient. All questions are answered to the best of my knowledge.     Visit today is associated with current or anticipated ongoing medical care related to this patient's single serious condition/complex condition as documented above.     Face to face time 60 minutes, including documentation, chart review, counseling, education, review of test results, relevant medical records, and coordination of care.       ESTEFANÍA FuentesC  General Neurology  05/15/2025

## 2025-08-26 ENCOUNTER — ON-DEMAND VIRTUAL (OUTPATIENT)
Dept: URGENT CARE | Facility: CLINIC | Age: 40
End: 2025-08-26
Payer: MEDICAID

## 2025-08-26 ENCOUNTER — TELEPHONE (OUTPATIENT)
Dept: INTERNAL MEDICINE | Facility: CLINIC | Age: 40
End: 2025-08-26
Payer: MEDICAID

## 2025-08-26 DIAGNOSIS — R20.0 NUMBNESS AND TINGLING OF RIGHT ARM: Primary | ICD-10-CM

## 2025-08-26 DIAGNOSIS — R20.2 NUMBNESS AND TINGLING OF RIGHT ARM: Primary | ICD-10-CM

## 2025-08-26 PROCEDURE — 98005 SYNCH AUDIO-VIDEO EST LOW 20: CPT | Mod: 95,,, | Performed by: NURSE PRACTITIONER

## 2025-08-27 ENCOUNTER — HOSPITAL ENCOUNTER (OUTPATIENT)
Dept: RADIOLOGY | Facility: HOSPITAL | Age: 40
Discharge: HOME | End: 2025-08-27
Attending: FAMILY MEDICINE
Payer: MEDICAID

## 2025-08-27 ENCOUNTER — OFFICE VISIT (OUTPATIENT)
Dept: URGENT CARE | Facility: CLINIC | Age: 40
End: 2025-08-27
Payer: MEDICAID

## 2025-08-27 ENCOUNTER — PATIENT MESSAGE (OUTPATIENT)
Dept: URGENT CARE | Facility: CLINIC | Age: 40
End: 2025-08-27

## 2025-08-27 ENCOUNTER — PATIENT MESSAGE (OUTPATIENT)
Dept: INTERNAL MEDICINE | Facility: CLINIC | Age: 40
End: 2025-08-27
Payer: MEDICAID

## 2025-08-27 VITALS
HEIGHT: 63 IN | TEMPERATURE: 99 F | SYSTOLIC BLOOD PRESSURE: 134 MMHG | RESPIRATION RATE: 20 BRPM | WEIGHT: 238 LBS | BODY MASS INDEX: 42.17 KG/M2 | OXYGEN SATURATION: 96 % | DIASTOLIC BLOOD PRESSURE: 93 MMHG | HEART RATE: 100 BPM

## 2025-08-27 DIAGNOSIS — M54.2 NECK PAIN: Primary | ICD-10-CM

## 2025-08-27 DIAGNOSIS — M54.2 NECK PAIN: ICD-10-CM

## 2025-08-27 PROCEDURE — 63600175 PHARM REV CODE 636 W HCPCS: Mod: JZ,TB

## 2025-08-27 PROCEDURE — 72040 X-RAY EXAM NECK SPINE 2-3 VW: CPT | Mod: TC

## 2025-08-27 PROCEDURE — 99214 OFFICE O/P EST MOD 30 MIN: CPT | Mod: PBBFAC,25 | Performed by: FAMILY MEDICINE

## 2025-08-27 RX ORDER — KETOROLAC TROMETHAMINE 30 MG/ML
60 INJECTION, SOLUTION INTRAMUSCULAR; INTRAVENOUS
Status: COMPLETED | OUTPATIENT
Start: 2025-08-27 | End: 2025-08-27

## 2025-08-27 RX ORDER — PREDNISONE 20 MG/1
20 TABLET ORAL 2 TIMES DAILY
Qty: 10 TABLET | Refills: 0 | Status: SHIPPED | OUTPATIENT
Start: 2025-08-27 | End: 2025-09-01

## 2025-08-27 RX ADMIN — KETOROLAC TROMETHAMINE 60 MG: 60 INJECTION, SOLUTION INTRAMUSCULAR at 01:08
